# Patient Record
Sex: FEMALE | Race: WHITE
[De-identification: names, ages, dates, MRNs, and addresses within clinical notes are randomized per-mention and may not be internally consistent; named-entity substitution may affect disease eponyms.]

---

## 2017-11-22 ENCOUNTER — HOSPITAL ENCOUNTER (OUTPATIENT)
Dept: HOSPITAL 62 - WI | Age: 27
End: 2017-11-22
Attending: FAMILY MEDICINE
Payer: COMMERCIAL

## 2017-11-22 DIAGNOSIS — N64.4: Primary | ICD-10-CM

## 2017-11-22 PROCEDURE — 76642 ULTRASOUND BREAST LIMITED: CPT

## 2017-11-22 NOTE — WOMENS IMAGING REPORT
EXAM DESCRIPTION:  U/S BREAST UNILAT LIMITED



COMPLETED DATE/TIME:  11/22/2017 11:43 am



REASON FOR STUDY:  LEFT BREAST PAIN N64.4 N64.4  MASTODYNIA



COMPARISON:  None.



TECHNIQUE:  Real-time and static grayscale imaging performed of the left breast targeted to the area 
of clinical concern. Selected color Doppler images recorded.



LIMITATIONS:  None.



FINDINGS:  Patient describes left breast pain in the inferior left breast.  Ultrasound of the lower h
tootie left breast was performed.  No.  No masses.  No worrisome acoustic absorption.  No focal findings
.



IMPRESSION:  No suspicious findings detected by ultrasound.



BIRAD:  1 Negative.



RECOMMENDATION:  RECOMMENDED FOLLOW-UP: Follow-up as clinically indicated.



COMMENT:  The American College of Radiology (ACR) has developed recommendations for screening MRI of 
the breasts in certain patient populations, to be used in conjunction with mammography.  Breast MRI s
urveillance may be appropriate for women with more than 20% lifetime risk of developing breast cancer
  as determined by genetic testing, significant family history of the disease, or history of mantle r
adiation for Hodgkins Disease.  ACR Practice Guidelines 2008.



TECHNICAL DOCUMENTATION:  JOB ID:  [id number]

 2011 [employer]- All Rights Reserved

## 2020-09-25 ENCOUNTER — HOSPITAL ENCOUNTER (EMERGENCY)
Dept: HOSPITAL 62 - ER | Age: 30
Discharge: HOME | End: 2020-09-25
Payer: COMMERCIAL

## 2020-09-25 VITALS — SYSTOLIC BLOOD PRESSURE: 126 MMHG | DIASTOLIC BLOOD PRESSURE: 88 MMHG

## 2020-09-25 DIAGNOSIS — R51: ICD-10-CM

## 2020-09-25 DIAGNOSIS — Z20.828: ICD-10-CM

## 2020-09-25 DIAGNOSIS — B34.9: Primary | ICD-10-CM

## 2020-09-25 DIAGNOSIS — R06.02: ICD-10-CM

## 2020-09-25 DIAGNOSIS — R07.9: ICD-10-CM

## 2020-09-25 LAB
ADD MANUAL DIFF: NO
ALBUMIN SERPL-MCNC: 4.7 G/DL (ref 3.5–5)
ALP SERPL-CCNC: 69 U/L (ref 38–126)
ANION GAP SERPL CALC-SCNC: 9 MMOL/L (ref 5–19)
AST SERPL-CCNC: 36 U/L (ref 14–36)
BASOPHILS # BLD AUTO: 0 10^3/UL (ref 0–0.2)
BASOPHILS NFR BLD AUTO: 0.5 % (ref 0–2)
BILIRUB DIRECT SERPL-MCNC: 0.4 MG/DL (ref 0–0.4)
BILIRUB SERPL-MCNC: 1.1 MG/DL (ref 0.2–1.3)
BUN SERPL-MCNC: 9 MG/DL (ref 7–20)
CALCIUM: 9.5 MG/DL (ref 8.4–10.2)
CHLORIDE SERPL-SCNC: 105 MMOL/L (ref 98–107)
CK MB SERPL-MCNC: < 0.22 NG/ML (ref ?–4.55)
CK SERPL-CCNC: 72 U/L (ref 30–135)
CO2 SERPL-SCNC: 26 MMOL/L (ref 22–30)
EOSINOPHIL # BLD AUTO: 0.1 10^3/UL (ref 0–0.6)
EOSINOPHIL NFR BLD AUTO: 1.6 % (ref 0–6)
ERYTHROCYTE [DISTWIDTH] IN BLOOD BY AUTOMATED COUNT: 13.3 % (ref 11.5–14)
GLUCOSE SERPL-MCNC: 96 MG/DL (ref 75–110)
HCT VFR BLD CALC: 44.9 % (ref 36–47)
HGB BLD-MCNC: 15.7 G/DL (ref 12–15.5)
LYMPHOCYTES # BLD AUTO: 2.1 10^3/UL (ref 0.5–4.7)
LYMPHOCYTES NFR BLD AUTO: 28.1 % (ref 13–45)
MCH RBC QN AUTO: 30.4 PG (ref 27–33.4)
MCHC RBC AUTO-ENTMCNC: 35 G/DL (ref 32–36)
MCV RBC AUTO: 87 FL (ref 80–97)
MONOCYTES # BLD AUTO: 0.6 10^3/UL (ref 0.1–1.4)
MONOCYTES NFR BLD AUTO: 8.5 % (ref 3–13)
NEUTROPHILS # BLD AUTO: 4.6 10^3/UL (ref 1.7–8.2)
NEUTS SEG NFR BLD AUTO: 61.3 % (ref 42–78)
PLATELET # BLD: 238 10^3/UL (ref 150–450)
POTASSIUM SERPL-SCNC: 4.9 MMOL/L (ref 3.6–5)
PROT SERPL-MCNC: 7.9 G/DL (ref 6.3–8.2)
RBC # BLD AUTO: 5.18 10^6/UL (ref 3.72–5.28)
TOTAL CELLS COUNTED % (AUTO): 100 %
TROPONIN I SERPL-MCNC: < 0.012 NG/ML
WBC # BLD AUTO: 7.5 10^3/UL (ref 4–10.5)

## 2020-09-25 PROCEDURE — 82550 ASSAY OF CK (CPK): CPT

## 2020-09-25 PROCEDURE — 93010 ELECTROCARDIOGRAM REPORT: CPT

## 2020-09-25 PROCEDURE — 36415 COLL VENOUS BLD VENIPUNCTURE: CPT

## 2020-09-25 PROCEDURE — 84484 ASSAY OF TROPONIN QUANT: CPT

## 2020-09-25 PROCEDURE — 71046 X-RAY EXAM CHEST 2 VIEWS: CPT

## 2020-09-25 PROCEDURE — 93005 ELECTROCARDIOGRAM TRACING: CPT

## 2020-09-25 PROCEDURE — 87635 SARS-COV-2 COVID-19 AMP PRB: CPT

## 2020-09-25 PROCEDURE — 82553 CREATINE MB FRACTION: CPT

## 2020-09-25 PROCEDURE — 99285 EMERGENCY DEPT VISIT HI MDM: CPT

## 2020-09-25 PROCEDURE — C9803 HOPD COVID-19 SPEC COLLECT: HCPCS

## 2020-09-25 PROCEDURE — 80053 COMPREHEN METABOLIC PANEL: CPT

## 2020-09-25 PROCEDURE — 85025 COMPLETE CBC W/AUTO DIFF WBC: CPT

## 2020-09-25 NOTE — ER DOCUMENT REPORT
Entered by ADRIEL ARNOLD SCRIBE  09/25/20 1232 





Acting as scribe for:AMINA CLINTON MD





ED General





- General


Chief Complaint: Chest Tightness


Stated Complaint: SHORTNESS OF BREATH


Time Seen by Provider: 09/25/20 11:34


Primary Care Provider: 


CINDY BUSH MD [Primary Care Provider] - Follow up as needed


Information source: Patient


Notes: 





This 30  year old female patient presents to the emergency department today with

complaints of shortness of breath, headache, and chest tightness which all began

last night. She reports that she "just tried to sleep" last night and this 

morning when she woke up her symptoms were still present. Patient mentions that 

there have been other coworkers of hers that called out sick with similar 

symptoms. She denies fevers, chills, or change in taste/smell.





TRAVEL OUTSIDE OF THE [place] IN LAST 30 DAYS: No





- Related Data


Allergies/Adverse Reactions: 


                                        





amoxicillin [Amoxicillin] Allergy (Verified 06/17/16 04:24)


   











Past Medical History





- General


Information source: Patient





- Social History


Smoking Status: Never Smoker


Cigarette use (# per day): No


Chew tobacco use (# tins/day): No


Frequency of alcohol use: Occasional


Drug Abuse: None


Lives with: Family


Family History: Reviewed & Not Pertinent


Past Surgical History: Reports: Hx Kidney (Renal Surgery) - For reflux as a 

child





Review of Systems





- Review of Systems


Constitutional: denies: Chills, Fever


EENT: No symptoms reported


Cardiovascular: See HPI, Other - chest tightness


Respiratory: See HPI, Short of breath


Gastrointestinal: No symptoms reported


Genitourinary: No symptoms reported


Female Genitourinary: See HPI, Last menstrual period - Sep 10


Musculoskeletal: No symptoms reported


Skin: No symptoms reported


Hematologic/Lymphatic: No symptoms reported


Neurological/Psychological: See HPI, Headaches


-: Yes All other systems reviewed and negative





Physical Exam





- Vital signs


Vitals: 


                                        











Temp Pulse Resp BP Pulse Ox


 


 97.9 F   75   16   157/100 H  98 


 


 09/25/20 09:26  09/25/20 09:26  09/25/20 09:26  09/25/20 09:26  09/25/20 09:26














- Notes


Notes: 





Physical Exam:


 


General: Alert, appears well. 


 


HEENT: Normocephalic. Atraumatic. PERRL. Extraocular movements intact. 

Oropharynx clear.


 


Neck: Supple.  Posterior cervical muscles are tender to palpation.


 


Respiratory: No respiratory distress. Clear and equal breath sounds bilaterally.


 


Cardiovascular: Regular rate and rhythm. 


 


Abdominal: Obese. Non-tender. No distension. Normal Bowel Sounds. 


 


Back: No gross abnormalities. 


 


Extremities: Moves all four extremities.


Upper extremities: Normal inspection. Normal ROM.  


Lower extremities: Normal inspection. No edema. Normal ROM.


 


Neurological: Normal cognition. AAOx4. Normal speech.  


 


Psychological: Normal affect. Normal Mood. 


 


Skin: Warm. Dry. Normal color.





Course





- Re-evaluation


Re-evalutation: 





09/25/20 12:55


The patient was evaluated during the global COVID-19 pandemic and that diagnosis

 was suspected/considered upon their initial presentation.  Their evaluation, 

treatment and testing was consistent with current guidelines for patients who 

present with complaints or symptoms that may be related to COVID-19.





Chest x-ray is unremarkable.  CBC and Chem-12 are normal.  Blood pressure and 

pulse are normal.  Room air oxygen saturation is 100%.  Patient does have a 

viral type symptoms causing headache, chest tightness, shortness of breath, and 

chest pressure.





- Vital Signs


Vital signs: 


                                        











Temp Pulse Resp BP Pulse Ox


 


 97.9 F   75   16   126/88 H  100 


 


 09/25/20 09:26  09/25/20 09:26  09/25/20 12:01  09/25/20 12:00  09/25/20 12:01














- Laboratory


Result Diagrams: 


                                 09/25/20 11:36





                                 09/25/20 11:36


Laboratory results interpreted by me: 


                                        











  09/25/20





  11:36


 


Hgb  15.7 H














- Diagnostic Test


Radiology reviewed: Image reviewed, Reports reviewed





- EKG Interpretation by Me


EKG shows normal: Sinus rhythm, Axis, Intervals, QRS Complexes, ST-T Waves


Rate: Normal - 78


Rhythm: NSR, PVC's


Voltage: Consistent with LVH


When compared to previous EKG there are: Previous EKG unavailable





Discharge





- Discharge


Clinical Impression: 


 Viral syndrome, Encounter for laboratory testing for COVID-19 virus





Condition: Stable


Disposition: HOME, SELF-CARE


Instructions:  COVID-19 Guidance for Persons Under Investigation


Additional Instructions: 


Viral Syndrome





     The physician has diagnosed a viral infection.  Viruses not only cause 

"colds," but can cause many different symptoms including generalized aching, 

fever, headache, cough, diarrhea, nausea, vomiting, and fatigue.


     The treatment, for the most part, is simply relief of symptoms. This means 

that antibiotics are usually not given.  Rest, fluids, pain medications and, 

occasionally, medication for the specific symptoms that are most bothersome will

 be prescribed. Use good handwashing to avoid passing the virus to others. 

Shared toys should be cleaned with disinfectant. Clean the toilets, sinks, and 

counter surfaces in bathrooms. Launder clothing in hot water.


     Contact the physician if you develop any new or unusual symptoms such as 

severe headache, stiff neck, high fever, chest pain, productive cough, or 

shortness of breath.  You should be rechecked if you don't see marked 

improvement within seven to 10 days.








******************************

********************************************************************************


******





Your chest x-ray and lab work were all normal.  Your oxygen saturation was at 

100%.


Your symptoms are most consistent with a viral illness, and at this time during 

the COVID-19 pandemic, the coronavirus has to be a consideration.





You should self isolate at home until you get the results of the COVID test.


Take Tylenol and ibuprofen for aches and pains and headache.


Drink plenty of fluids get plenty of rest.


Follow-up with your primary care provider if not improving by next week.





RETURN TO THE EMERGENCY ROOM IF ANY NEW OR WORSENING SYMPTOMS.








Referrals: 


CINDY BUSH MD [Primary Care Provider] - Follow up as needed





I personally performed the services described in the documentation, reviewed and

edited the documentation which was dictated to the scribe in my presence, and it

accurately records my words and actions.

## 2020-09-25 NOTE — EKG REPORT
SEVERITY:- ABNORMAL ECG -

SINUS RHYTHM

VENTRICULAR PREMATURE COMPLEX

CONSIDER LEFT VENTRICULAR HYPERTROPHY

:

Confirmed by: Sheyla Florez MD 25-Sep-2020 21:20:16

## 2020-09-25 NOTE — RADIOLOGY REPORT (SQ)
EXAM DESCRIPTION:  CHEST 2 VIEWS



IMAGES COMPLETED DATE/TIME:  9/25/2020 12:04 pm



REASON FOR STUDY:  cp



COMPARISON:  None.



EXAM PARAMETERS:  NUMBER OF VIEWS: two views

TECHNIQUE: Digital Frontal and Lateral radiographic views of the chest acquired.

RADIATION DOSE: NA

LIMITATIONS: none



FINDINGS:  LUNGS AND PLEURA: No opacities, masses or pneumothorax. No pleural effusion.

MEDIASTINUM AND HILAR STRUCTURES: No masses or contour abnormalities.

HEART AND VASCULAR STRUCTURES: Heart normal size.  No evidence for failure.

BONES: No acute findings.

HARDWARE: None in the chest.

OTHER: No other significant finding.



IMPRESSION:  NO ACUTE RADIOGRAPHIC FINDING IN THE CHEST.



TECHNICAL DOCUMENTATION:  JOB ID:  [id number]

 2011 [employer]- All Rights Reserved



Reading location - IP/workstation name: [email or web address]